# Patient Record
Sex: FEMALE | Race: WHITE | NOT HISPANIC OR LATINO | URBAN - METROPOLITAN AREA
[De-identification: names, ages, dates, MRNs, and addresses within clinical notes are randomized per-mention and may not be internally consistent; named-entity substitution may affect disease eponyms.]

---

## 2023-03-30 ENCOUNTER — APPOINTMENT (RX ONLY)
Dept: URBAN - METROPOLITAN AREA CLINIC 85 | Facility: CLINIC | Age: 73
Setting detail: DERMATOLOGY
End: 2023-03-30

## 2023-03-30 DIAGNOSIS — I87.2 VENOUS INSUFFICIENCY (CHRONIC) (PERIPHERAL): ICD-10-CM | Status: INADEQUATELY CONTROLLED

## 2023-03-30 PROCEDURE — 99203 OFFICE O/P NEW LOW 30 MIN: CPT

## 2023-03-30 PROCEDURE — ? MEDICAL CONSULTATION: VENOUS DISEASE

## 2023-03-30 NOTE — PROCEDURE: MEDICAL CONSULTATION: VENOUS DISEASE
Right Leg Pain: 2- Daily, moderate activity limitation, occasional analgesics
Right Leg Compression Therapy: 0- None or noncompliant
Right Leg Active Ulcers: 0- None
Include Vcss In The Note?: Yes
Right Leg Circumference: medium
Right Leg Varicose Veins: 2- Multiple: GS varicose veins confined to calf or thigh
Right Dorsalis Pedis Pulse: 2 (Easily palpable)
Length Of Time Symptoms Present (Include Units): 10 years
Detail Level: Detailed
Right Leg: Peripheral Vascular Disease?: No
Left Leg Venous Hyperpigmentation: 0- None or focal low intensity (tan)